# Patient Record
Sex: MALE | Race: WHITE | Employment: FULL TIME | URBAN - METROPOLITAN AREA
[De-identification: names, ages, dates, MRNs, and addresses within clinical notes are randomized per-mention and may not be internally consistent; named-entity substitution may affect disease eponyms.]

---

## 2017-01-19 ENCOUNTER — HOSPITAL ENCOUNTER (OUTPATIENT)
Dept: PHYSICAL THERAPY | Age: 52
Discharge: HOME | End: 2017-01-19
Payer: COMMERCIAL

## 2017-01-19 PROCEDURE — 97162 PT EVAL MOD COMPLEX 30 MIN: CPT

## 2017-01-19 NOTE — PROGRESS NOTES
Nataly Enriquez  : 1965 [facility] at [address] [place] [address]  [phone]   [phone]         OUTPATIENT PHYSICAL THERAPY:Initial Assessment 2017    ICD-10: Treatment Diagnosis: LOW BACK PAIN, M54.5, RADICULOPATHY, LUMBAR REGION M54.16  Precautions/Allergies: non reported  Fall Risk Score: 1 (? 5 = High Risk)  MD Orders: evaluate and treat MEDICAL/REFERRING DIAGNOSIS:lumbar stenosis   DATE OF ONSET: 1.5 yrs  REFERRING PHYSICIAN:Dr. Thomas  RETURN PHYSICIAN APPOINTMENT: no apt scheduled     INITIAL ASSESSMENT:  Mr. Tato Cotton presents to physical therapy with bilateral LE and UE paresthesia. The examination reveals thoracic and L5-S1 hypomobility and tenderness. Based on his history and positive treadmill test with lumbar flexion, it seems that he is suffering from a central stenosis but unclear at what level at this point. His evaluation is at a  moderate complexity due to evolving clinical presentation. Skilled physical therapy is recommended to progress the plan of care in order for the patient to return to full function with minimal symptoms. PROBLEM LIST (Impacting functional limitations):  1. Decreased Strength  2. Decreased ADL/Functional Activities  3. Increased Pain  4. Decreased Activity Tolerance  5. Decreased Flexibility/Joint Mobility  6. Decreased [place] with Home Exercise Program INTERVENTIONS PLANNED:  1. Cold  2. Heat  3. Home Exercise Program (HEP)  4. Manual Therapy  5. Range of Motion (ROM)  6. Therapeutic Exercise/Strengthening     TREATMENT PLAN:  Effective Dates: 17 TO 3-19-17. Frequency/Duration: 1 time a week for 8 weeks  GOALS: (Goals have been discussed and agreed upon with patient.)  SHORT-TERM FUNCTIONAL GOALS: Time Frame: 2-4 wks  1. Patient will report 25-50% reduction in overall symptoms  2. Patient will be compliant with HEP and plan of care  3.   Patient will report being able to control his symptoms through postural measures   4. Patient will improve on the LEFS by 3-5 points  DISCHARGE GOALS: Time Frame: 6-8 wk  1. Patient will report % reduction in overall symptoms in order to be able to   2. Patient will being able to walk for 30 min with minimal increase in symptoms  3. Patient will improve on the LEFS by 6-7  points in order to demonstrate improved function with less pain    Rehabilitation Potential For Stated Goals: Good    Regarding Fred[facility] Abt therapy, I certify that the treatment plan above will be carried out by a therapist or under their direction. Thank you for this referral,  Lana Maloney DPT,OCS    Referring Physician Signature: Dr. Rebecca Durham  _____________________________________________________ Date: __________________________________          HISTORY:   History of Present Injury/Illness (Reason for Referral): reports that he has had bilateral LE numbness/tingiling for about a 1.5 yr that is gradually getting worse. His arms also tingle but not as bad as the legs. The leg numbness is worse with walking and standing. An MRI  was done which showed changes at L5-S1;however, the EMG showed changes at L4.  The neurologist told him that it was non surgical.   Past Medical History/Comorbidities: non reported  Social History/Living Environment: lives with wife and kids  Prior Level of Function/Work/Activity:mostly a desk job, travels a lot for work  Current Medications:   Non reported  Date Last Reviewed: 1/19/2017   Number of Personal Factors/Comorbidities that affect the Plan of Care: 1-2: MODERATE COMPLEXITY   EXAMINATION:   Observation/Orthostatic Postural Assessment:    Standing resting posture:  · Decreased mid thoracic kyphosis, decrease lumbar lordosis     Palpation/tone/tissue texture:    ASIS:symmetrical  PSIS:symmetrical  Leg Length: supine: symmetrical         Long Sitting:symmetrical     Soft tissue:  · Lumbar extensors/QL: mild tightness bilateral   · Hip flexors:n/a today  · Hamstrings: moderate tightness on L   · IT band:n/a    PAIVM:  · Increased hypomobility at L5-S1 bilateral     ROM:   Multisegmental ROM Date:  1-19-17       Flexion 75%, NP   Extension 100%, NP   Rotation L 100%, NP   Rotation R 100%,NP   Quadrant exten (-)   Quadrant flex (-)       Lumbar ROM       (tested in sitting) Date:  1-19-17       Flexion wnl   Extension wnl     Hip ROM Date:  1-19-17       Right Left   Flexion wnl wnl   Extension wnl wnl   IR wnl wnl   ER wnl wnl    Thoracic ROM (tested in sitting) Date:  1-19-17       Flexion wnl   Extension Limited in upper and mid thoracic    Rotation L 75%   Rotation R 75%   Side bend L N/a    Side bend R n/a           Strength:  Hip strength Date:  1-19-17       Right Left   Abduction 5 5   Extension 5 5   IR 5 5   ER 5 5      Core strength Date:  1-19-17       Brace activation n/a             Special Tests:   · Treadmill walk with lumbar flexion: 10 min (able to do it without neurological symptoms in his LE's)   Neurological Screen:   Myotomes: strong in bilateral UE and LE's         Dermatomes: intact to light touch and sharp/dull in bilateral UE and LE's         Reflexes: all reflexes 2+ accept achilles which was absent bilateral          Neural Tension Tests: SLR:(-)   Slump: (-), increased tightness/tension on L  Functional Mobility:    · Overhead double leg squat:wnl  · Gait pattern:wnl  Balance:  Single leg   L:10 sec, mild sway  R: 10 sec, mild sway     Body Structures Involved:  1. Muscles  2. nerves Body Functions Affected:  1. Sensory/Pain  2. Neuromusculoskeletal  3. Movement Related Activities and Participation Affected:  1. General Tasks and Demands  2. Community, Social and [place]   Number of elements that affect the Plan of Care: 3: MODERATE COMPLEXITY   CLINICAL PRESENTATION:   Presentation: Evolving clinical presentation with changing clinical characteristics: MODERATE COMPLEXITY   CLINICAL DECISION MAKING:   Outcome Measure: .   Tool Used: Lower Extremity Functional Scale (LEFS)  Score:  Initial: 67/80 Most Recent: X/80 (Date: -- )   Interpretation of Score: 20 questions each scored on a 5 point scale with 0 representing \"extreme difficulty or unable to perform\" and 4 representing \"no difficulty\". The lower the score, the greater the functional disability. 80/80 represents no disability. Minimal detectable change is 9 points. Score 80 79-63 62-48 47-32 31-16 15-1 0   Modifier CH CI CJ CK CL CM CN       Medical Necessity:   · Patient is expected to demonstrate progress in strength, range of motion and symptom levels to return to full function. Reason for Services/Other Comments:  · Patient continues to require skilled intervention due to ongoing symptoms. Use of outcome tool(s) and clinical judgement create a POC that gives a: Questionable prediction of patient's progress: MODERATE COMPLEXITY   TREATMENT:   (In addition to Assessment/Re-Assessment sessions the following treatments were rendered)  Therapeutic Exercise: (5 min) Done in order to improve strength, ROM and understanding of current condition. Date:  1-19-17   Activity/Exercise Parameters   Education · Discussed examination findings, HEP, plan of care  · Discussed standing and sleeping posture   Lumbar flexion Either in sitting or double knee to chest in supine, 60 sec       Manual Therapy: (-) Done in order to improve joint and soft tissue mobility,reduce muscle guarding, and decrease muscle tone    Modalities: (-) Done in order to reduce swelling and pain    Treatment/Session Assessment:  Mr. Chuck Parker did not have an increase in his LE symptoms with walking at an incline for 12 min with slight lumbar flexion indicating that his symptoms are most likely due to central stenosis. We discussed his home program and plan of care  · Pre-treatment Symptoms:  No pain at rest reported  · Pain: Initial:    0/10 Post Session:  0/10 ·   Compliance with Program/Exercises:  Will assess as treatment progresses. · Recommendations/Intent for next treatment session: \"Next visit will focus on progressing the patients plan of care\".   Total Treatment Duration: 5 min  PT Patient Time In/Time Out  Time In: 1400  Time Out: Nigel 119 DPT,OCS

## 2017-01-24 NOTE — PROGRESS NOTES
Ambulatory/Rehab Services H2 Model Falls Risk Assessment    Risk Factor Pts. ·   Confusion/Disorientation/Impulsivity  []    4 ·   Symptomatic Depression  []   2 ·   Altered Elimination  []   1 ·   Dizziness/Vertigo  []   1 ·   Gender (Male)  [x]   1 ·   Any administered antiepileptics (anticonvulsants):  []   2 ·   Any administered benzodiazepines:  []   1 ·   Visual Impairment (specify):  []   1 ·   Portable Oxygen Use  []   1 ·   Orthostatic ? BP  []   1 ·   History of Recent Falls (within 3 mos.)  []   5     Ability to Rise from Chair (choose one) Pts. ·   Ability to rise in a single movement  [x]   0 ·   Pushes up, successful in one attempt  []   1 ·   Multiple attempts, but successful  []   3 ·   Unable to rise without assistance  []   4   Total: (5 or greater = High Risk) 1     Falls Prevention Plan:   []                Physical Limitations to Exercise (specify):   []                Mobility Assistance Device (type):   []                Exercise/Equipment Adaptation (specify):    ©2010 [facility] Patent #[id number].  Federal Law prohibits the replication, distribution or use without written permission from [facility] [employer]

## 2017-02-02 ENCOUNTER — HOSPITAL ENCOUNTER (OUTPATIENT)
Dept: PHYSICAL THERAPY | Age: 52
Discharge: HOME | End: 2017-02-02
Payer: COMMERCIAL

## 2017-02-02 PROCEDURE — 97110 THERAPEUTIC EXERCISES: CPT

## 2017-02-02 NOTE — PROGRESS NOTES
Diane Beck  : 1965 [facility] at [address] [place] [address]  [phone]   [phone]         OUTPATIENT PHYSICAL THERAPY:Daily Note 2017    ICD-10: Treatment Diagnosis: LOW BACK PAIN, M54.5, RADICULOPATHY, LUMBAR REGION M54.16  Precautions/Allergies: non reported  Fall Risk Score: 1 (? 5 = High Risk)  MD Orders: evaluate and treat MEDICAL/REFERRING DIAGNOSIS:lumbar stenosis   DATE OF ONSET: 1.5 yrs  REFERRING PHYSICIAN:Dr. Thomas  RETURN PHYSICIAN APPOINTMENT: no apt scheduled     INITIAL ASSESSMENT:  Mr. Harry Rodriguez presents to physical therapy with bilateral LE and UE paresthesia. The examination reveals thoracic and L5-S1 hypomobility and tenderness. Based on his history and positive treadmill test with lumbar flexion, it seems that he is suffering from a central stenosis but unclear at what level at this point. His evaluation is at a  moderate complexity due to evolving clinical presentation. Skilled physical therapy is recommended to progress the plan of care in order for the patient to return to full function with minimal symptoms. PROBLEM LIST (Impacting functional limitations):  1. Decreased Strength  2. Decreased ADL/Functional Activities  3. Increased Pain  4. Decreased Activity Tolerance  5. Decreased Flexibility/Joint Mobility  6. Decreased [place] with Home Exercise Program INTERVENTIONS PLANNED:  1. Cold  2. Heat  3. Home Exercise Program (HEP)  4. Manual Therapy  5. Range of Motion (ROM)  6. Therapeutic Exercise/Strengthening     TREATMENT PLAN:  Effective Dates: 17 TO 3-19-17. Frequency/Duration: 1 time a week for 8 weeks  GOALS: (Goals have been discussed and agreed upon with patient.)  SHORT-TERM FUNCTIONAL GOALS: Time Frame: 2-4 wks  1. Patient will report 25-50% reduction in overall symptoms  2. Patient will be compliant with HEP and plan of care  3.   Patient will report being able to control his symptoms through postural measures   4. Patient will improve on the LEFS by 3-5 points  DISCHARGE GOALS: Time Frame: 6-8 wk  1. Patient will report % reduction in overall symptoms in order to be able to   2. Patient will being able to walk for 30 min with minimal increase in symptoms  3. Patient will improve on the LEFS by 6-7  points in order to demonstrate improved function with less pain    Rehabilitation Potential For Stated Goals: Good    Regarding [place] therapy, I certify that the treatment plan above will be carried out by a therapist or under their direction. Thank you for this referral,  Omar Muñoz DPT,[facility]    Referring Physician Signature: Dr. Claudette Pallas  _____________________________________________________ Date: __________________________________          HISTORY:   History of Present Injury/Illness (Reason for Referral): reports that he has had bilateral LE numbness/tingiling for about a 1.5 yr that is gradually getting worse. His arms also tingle but not as bad as the legs. The leg numbness is worse with walking and standing. An MRI  was done which showed changes at L5-S1;however, the EMG showed changes at L4.  The neurologist told him that it was non surgical.   Past Medical History/Comorbidities: non reported  Social History/Living Environment: lives with wife and kids  Prior Level of Function/Work/Activity:mostly a desk job, travels a lot for work  Current Medications:   Non reported  Date Last Reviewed: 2/2/2017   Number of Personal Factors/Comorbidities that affect the Plan of Care: 1-2: MODERATE COMPLEXITY   EXAMINATION:   Observation/Orthostatic Postural Assessment:    Standing resting posture:  · Decreased mid thoracic kyphosis, decrease lumbar lordosis     Palpation/tone/tissue texture:    ASIS:symmetrical  PSIS:symmetrical  Leg Length: supine: symmetrical         Long Sitting:symmetrical     Soft tissue:  · Lumbar extensors/QL: mild tightness bilateral   · Hip flexors:n/a today  · Hamstrings: moderate tightness on L   · IT band:n/a    PAIVM:  · Increased hypomobility at L5-S1 bilateral     ROM:   Multisegmental ROM Date:  1-19-17       Flexion 75%, NP   Extension 100%, NP   Rotation L 100%, NP   Rotation R 100%,NP   Quadrant exten (-)   Quadrant flex (-)       Lumbar ROM       (tested in sitting) Date:  1-19-17       Flexion wnl   Extension wnl     Hip ROM Date:  1-19-17       Right Left   Flexion wnl wnl   Extension wnl wnl   IR wnl wnl   ER wnl wnl    Thoracic ROM (tested in sitting) Date:  1-19-17       Flexion wnl   Extension Limited in upper and mid thoracic    Rotation L 75%   Rotation R 75%   Side bend L N/a    Side bend R n/a           Strength:  Hip strength Date:  1-19-17       Right Left   Abduction 5 5   Extension 5 5   IR 5 5   ER 5 5      Core strength Date:  1-19-17       Brace activation n/a             Special Tests:   · Treadmill walk with lumbar flexion: 10 min (able to do it without neurological symptoms in his LE's)   Neurological Screen:   Myotomes: strong in bilateral UE and LE's         Dermatomes: intact to light touch and sharp/dull in bilateral UE and LE's         Reflexes: all reflexes 2+ accept achilles which was absent bilateral          Neural Tension Tests: SLR:(-)   Slump: (-), increased tightness/tension on L  Functional Mobility:    · Overhead double leg squat:wnl  · Gait pattern:wnl  Balance:  Single leg   L:10 sec, mild sway  R: 10 sec, mild sway     Body Structures Involved:  1. Muscles  2. nerves Body Functions Affected:  1. Sensory/Pain  2. Neuromusculoskeletal  3. Movement Related Activities and Participation Affected:  1. General Tasks and Demands  2. Community, Social and [place]   Number of elements that affect the Plan of Care: 3: MODERATE COMPLEXITY   CLINICAL PRESENTATION:   Presentation: Evolving clinical presentation with changing clinical characteristics: MODERATE COMPLEXITY   CLINICAL DECISION MAKING:   Outcome Measure: .   Tool Used: Lower Extremity Functional Scale (LEFS)  Score:  Initial: 67/80 Most Recent: X/80 (Date: -- )   Interpretation of Score: 20 questions each scored on a 5 point scale with 0 representing \"extreme difficulty or unable to perform\" and 4 representing \"no difficulty\". The lower the score, the greater the functional disability. 80/80 represents no disability. Minimal detectable change is 9 points. Score 80 79-63 62-48 47-32 31-16 15-1 0   Modifier CH CI CJ CK CL CM CN       Medical Necessity:   · Patient is expected to demonstrate progress in strength, range of motion and symptom levels to return to full function. Reason for Services/Other Comments:  · Patient continues to require skilled intervention due to ongoing symptoms. Use of outcome tool(s) and clinical judgement create a POC that gives a: Questionable prediction of patient's progress: MODERATE COMPLEXITY   TREATMENT:   (In addition to Assessment/Re-Assessment sessions the following treatments were rendered)  Therapeutic Exercise: (45 min) Done in order to improve strength, ROM and understanding of current condition. Date:  1-19-17 Date  2-2-17   Activity/Exercise Parameters    Education · Discussed examination findings, HEP, plan of care  · Discussed standing and sleeping posture · Discussed his plan of care  · Discussed pathology of a degenerative disc  · Discussed activities to avoid   · Discussed sleeping posture again and educated to avoid bringing his arm over his head  · Discussed Lumbar traction   Prone press ups  10x to tolerance   Manual Therapy: (-) Done in order to improve joint and soft tissue mobility,reduce muscle guarding, and decrease muscle tone    Modalities: (-) Done in order to reduce swelling and pain    Treatment/Session Assessment:  We spent a bulk of the time on education today. Discussed the pathology of degenerative discs and importance to get motion at that level.  Was given an updated home program to see if that helps reduce any of his symptoms. We will follow via e-mail to see if further tx is needed. · Pre-treatment Symptoms:  Reports that his tingling pain has only continued to magnify. The exercises have not helped to subside them  · Pain: Initial:    1/10 Post Session:  1/10 ·   Compliance with Program/Exercises: Will assess as treatment progresses. · Recommendations/Intent for next treatment session: \"Next visit will focus on progressing the patients plan of care\".   Total Treatment Duration: 45 min  PT Patient Time In/Time Out  Time In: 1400  Time Out: 1500    Emiliano Loaiza DPT,OCS

## 2017-05-30 NOTE — PROGRESS NOTES
Marya Anna  : 1965 [facility] at [address] [place] [address]  [phone]   [phone]         OUTPATIENT PHYSICAL THERAPY:Discontinuation Summary 2017    ICD-10: Treatment Diagnosis: LOW BACK PAIN, M54.5, RADICULOPATHY, LUMBAR REGION M54.16  Precautions/Allergies: non reported  Fall Risk Score: 1 (? 5 = High Risk)  MD Orders: evaluate and treat MEDICAL/REFERRING DIAGNOSIS:lumbar stenosis   DATE OF ONSET: 1.5 yrs  REFERRING PHYSICIAN:Dr. Thomas  RETURN PHYSICIAN APPOINTMENT: no apt scheduled     INITIAL ASSESSMENT:  Mr. Gordo Meeks had come for 2 visits from the dates of 17 to 17. During time he was given a home program that seemed to be controlling his symptoms. He was given a period of time to make sure his symptoms were still well managed. During our last conversation he reported that he still felt the symptoms but much less than before. At this time his plan of care will be discontinued. Thank you.     Daylin Cole PT,DPT

## 2018-05-17 PROBLEM — G62.9 POLYNEUROPATHY: Status: ACTIVE | Noted: 2018-05-17

## 2018-05-17 PROBLEM — G56.03 CARPAL TUNNEL SYNDROME, BILATERAL UPPER LIMBS: Status: ACTIVE | Noted: 2018-05-17

## 2018-05-17 PROBLEM — Z79.899 ENCOUNTER FOR MEDICATION MANAGEMENT: Status: ACTIVE | Noted: 2018-05-17

## 2018-05-17 PROBLEM — R20.2 PARESTHESIA OF LEFT UPPER AND LOWER EXTREMITY: Status: ACTIVE | Noted: 2018-05-17

## 2018-05-17 PROBLEM — R68.89 ABNORMAL CLINICAL FINDING: Status: ACTIVE | Noted: 2018-05-17

## 2024-11-25 ENCOUNTER — APPOINTMENT (RX ONLY)
Dept: URBAN - NONMETROPOLITAN AREA CLINIC 1 | Facility: CLINIC | Age: 59
Setting detail: DERMATOLOGY
End: 2024-11-25

## 2024-11-25 DIAGNOSIS — L57.8 OTHER SKIN CHANGES DUE TO CHRONIC EXPOSURE TO NONIONIZING RADIATION: ICD-10-CM | Status: STABLE

## 2024-11-25 DIAGNOSIS — D18.0 HEMANGIOMA: ICD-10-CM

## 2024-11-25 DIAGNOSIS — L82.1 OTHER SEBORRHEIC KERATOSIS: ICD-10-CM

## 2024-11-25 DIAGNOSIS — D22 MELANOCYTIC NEVI: ICD-10-CM

## 2024-11-25 DIAGNOSIS — L81.4 OTHER MELANIN HYPERPIGMENTATION: ICD-10-CM

## 2024-11-25 PROBLEM — D22.5 MELANOCYTIC NEVI OF TRUNK: Status: ACTIVE | Noted: 2024-11-25

## 2024-11-25 PROBLEM — D18.01 HEMANGIOMA OF SKIN AND SUBCUTANEOUS TISSUE: Status: ACTIVE | Noted: 2024-11-25

## 2024-11-25 PROCEDURE — ? TREATMENT REGIMEN

## 2024-11-25 PROCEDURE — ? FULL BODY SKIN EXAM

## 2024-11-25 PROCEDURE — 99203 OFFICE O/P NEW LOW 30 MIN: CPT

## 2024-11-25 PROCEDURE — ? COUNSELING

## 2024-11-25 PROCEDURE — ? OBSERVATION AND MEASURE

## 2024-11-25 ASSESSMENT — LOCATION SIMPLE DESCRIPTION DERM
LOCATION SIMPLE: LEFT UPPER BACK
LOCATION SIMPLE: LEFT LOWER BACK
LOCATION SIMPLE: RIGHT FOREARM
LOCATION SIMPLE: LEFT FOREARM
LOCATION SIMPLE: CHEST
LOCATION SIMPLE: RIGHT FOREHEAD

## 2024-11-25 ASSESSMENT — LOCATION ZONE DERM
LOCATION ZONE: ARM
LOCATION ZONE: TRUNK
LOCATION ZONE: FACE

## 2024-11-25 ASSESSMENT — LOCATION DETAILED DESCRIPTION DERM
LOCATION DETAILED: RIGHT PROXIMAL RADIAL DORSAL FOREARM
LOCATION DETAILED: RIGHT SUPERIOR MEDIAL FOREHEAD
LOCATION DETAILED: LEFT SUPERIOR LATERAL LOWER BACK
LOCATION DETAILED: LEFT SUPERIOR MEDIAL MIDBACK
LOCATION DETAILED: RIGHT MEDIAL SUPERIOR CHEST
LOCATION DETAILED: LEFT INFERIOR MEDIAL UPPER BACK
LOCATION DETAILED: LEFT PROXIMAL DORSAL FOREARM